# Patient Record
Sex: MALE | Race: WHITE | Employment: UNEMPLOYED | URBAN - METROPOLITAN AREA
[De-identification: names, ages, dates, MRNs, and addresses within clinical notes are randomized per-mention and may not be internally consistent; named-entity substitution may affect disease eponyms.]

---

## 2018-07-12 ENCOUNTER — MEDICAL CORRESPONDENCE (OUTPATIENT)
Dept: HEALTH INFORMATION MANAGEMENT | Facility: CLINIC | Age: 43
End: 2018-07-12

## 2018-07-28 ENCOUNTER — HOSPITAL ENCOUNTER (EMERGENCY)
Facility: CLINIC | Age: 43
Discharge: HOME | End: 2018-07-28
Attending: PSYCHIATRY & NEUROLOGY | Admitting: PSYCHIATRY & NEUROLOGY
Payer: COMMERCIAL

## 2018-07-28 VITALS
SYSTOLIC BLOOD PRESSURE: 115 MMHG | DIASTOLIC BLOOD PRESSURE: 80 MMHG | RESPIRATION RATE: 18 BRPM | TEMPERATURE: 98 F | OXYGEN SATURATION: 98 %

## 2018-07-28 DIAGNOSIS — F19.10 POLYSUBSTANCE ABUSE (H): ICD-10-CM

## 2018-07-28 DIAGNOSIS — F39 MOOD DISORDER (H): ICD-10-CM

## 2018-07-28 LAB
AMPHETAMINES UR QL SCN: POSITIVE
BARBITURATES UR QL: NEGATIVE
BENZODIAZ UR QL: POSITIVE
CANNABINOIDS UR QL SCN: POSITIVE
COCAINE UR QL: NEGATIVE
ETHANOL UR QL SCN: NEGATIVE
OPIATES UR QL SCN: POSITIVE

## 2018-07-28 PROCEDURE — 99283 EMERGENCY DEPT VISIT LOW MDM: CPT | Mod: Z6 | Performed by: PSYCHIATRY & NEUROLOGY

## 2018-07-28 PROCEDURE — 80320 DRUG SCREEN QUANTALCOHOLS: CPT | Performed by: PSYCHIATRY & NEUROLOGY

## 2018-07-28 PROCEDURE — 99285 EMERGENCY DEPT VISIT HI MDM: CPT | Mod: 25 | Performed by: PSYCHIATRY & NEUROLOGY

## 2018-07-28 PROCEDURE — 80307 DRUG TEST PRSMV CHEM ANLYZR: CPT | Performed by: PSYCHIATRY & NEUROLOGY

## 2018-07-28 PROCEDURE — 90791 PSYCH DIAGNOSTIC EVALUATION: CPT

## 2018-07-28 ASSESSMENT — ENCOUNTER SYMPTOMS
ABDOMINAL PAIN: 0
SHORTNESS OF BREATH: 0
FEVER: 0
NERVOUS/ANXIOUS: 0
DYSPHORIC MOOD: 1
HALLUCINATIONS: 0

## 2018-07-28 NOTE — ED AVS SNAPSHOT
[facility], Emergency Department    [address]    [facility] [address]    Phone:  [phone]    Fax:  [phone]                                       Beck Bran   MRN: [id number]    Department:  [facility], Emergency Department   Date of Visit:  7/28/2018           After Visit Summary Signature Page     I have received my discharge instructions, and my questions have been answered. I have discussed any challenges I see with this plan with the nurse or doctor.    ..........................................................................................................................................  Patient/Patient Representative Signature      ..........................................................................................................................................  Patient Representative Print Name and Relationship to Patient    ..................................................               ................................................  Date                                            Time    ..........................................................................................................................................  Reviewed by Signature/Title    ...................................................              ..............................................  Date                                                            Time

## 2018-07-28 NOTE — ED AVS SNAPSHOT
[facility], Emergency Department    [address]    [place] [address]    Phone:  [phone]    Fax:  [phone]                                       Beck Bran   MRN: [id number]    Department:  [facility], Emergency Department   Date of Visit:  7/28/2018           Patient Information     Date Of Birth          1975        Your diagnoses for this visit were:     Mood disorder (H)     Polysubstance abuse        You were seen by Puneet Molina MD.        Discharge Instructions       Stop all illicit substances.  If your are unable to stop on your own, use the list of CD treatment programs and Rule 25 numbers to get yourself into treatmetn    Follow up with therapy when scheduled    Follow up with a medication provider when scheduled    24 Hour Appointment Hotline       To make an appointment at any [place] clinic, call [phone] ([phone]). If you don't have a family doctor or clinic, we will help you find one. [place] clinics are conveniently located to serve the needs of you and your family.             Review of your medicines      Our records show that you are taking the medicines listed below. If these are incorrect, please call your family doctor or clinic.        Dose / Directions Last dose taken    buprenorphine-naloxone 8-2 MG Subl sublingual tablet   Commonly known as:  SUBOXONE   Dose:  1 tablet        Place 1 tablet under the tongue 3 times daily   Refills:  0        CEPHALEXIN PO   Dose:  500 mg        Take 500 mg by mouth 3 times daily   Refills:  0        MELATONIN PO        Take by mouth nightly as needed   Refills:  0        MOTRIN IB PO   Dose:  600 mg        Take 600 mg by mouth 4 times daily   Refills:  0                Procedures and tests performed during your visit     Drug abuse screen 6 urine (chem dep) ([facility])      Orders Needing Specimen Collection     None      Pending Results     No orders found from 7/26/2018 to 7/29/2018.           "  Pending Culture Results     No orders found from 2018 to 2018.            Pending Results Instructions     If you had any lab results that were not finalized at the time of your Discharge, you can call the ED Lab Result RN at [phone]. You will be contacted by this team for any positive Lab results or changes in treatment. The nurses are available 7 days a week from 10A to 6:30P.  You can leave a message 24 hours per day and they will return your call.        Thank you for choosing [facility]       Thank you for choosing [facility] for your care. Our goal is always to provide you with excellent care. Hearing back from our patients is one way we can continue to improve our services. Please take a few minutes to complete the written survey that you may receive in the mail after you visit with us. Thank you!        [employer] Information     [employer] lets you send messages to your doctor, view your test results, renew your prescriptions, schedule appointments and more. To sign up, go to [email or web address] . Click on \"Log in\" on the left side of the screen, which will take you to the Welcome page. Then click on \"Sign up Now\" on the right side of the page.     You will be asked to enter the access code listed below, as well as some personal information. Please follow the directions to create your username and password.     Your access code is: [email or web address]  Expires: 10/26/2018  9:30 PM     Your access code will  in 90 days. If you need help or a new code, please call your [facility] clinic or [phone].        Care EveryWhere ID     This is your Care EveryWhere ID. This could be used by other organizations to access your [facility] medical records  [id number]        Equal Access to Services     DANIKA AVILES : sebastian Arteaga, alesia polo. So [facility] [phone].    ATENCIÓN: Si habla español, tiene a fitzpatrick disposición " servicios gratuitos de asistencia lingüística. Yue ko [phone].    We comply with applicable federal civil rights laws and [place] laws. We do not discriminate on the basis of race, color, national origin, age, disability, sex, sexual orientation, or gender identity.            After Visit Summary       This is your record. Keep this with you and show to your community pharmacist(s) and doctor(s) at your next visit.

## 2018-07-29 NOTE — ED PROVIDER NOTES
"  History     Chief Complaint   Patient presents with     Depression     Pt quit taking suboxone in Feb. His  depression is getting worse.      The history is provided by the patient and medical records.     Beck Bran is a 42 year old male who comes in due to his worsening depression. He came in with his wife who was being seen for a medical reason. He states \"its not fair to my wife to be this depressed all the time.\" He describes feeling sad, not having much energy and isolating. He has little motivation and some anhedonia. He has occasional very passive wishes to be dead.  He denies any suicidal thoughts now. He denies that he has ever attempted. He has a history of opioid dependence and used to be on suboxone maintenance. He thought the suboxone was making him more depressed so he stopped it in Feb.  He has still been depressed. He minimizes his drug use stating he uses marijuana a few times.  He is positive for THC, benzos, amphetamine and opioids. He is hoping to get some resources set up to help.    Please see the 's assessment in EPIC from today (7/28/18) for further details.    I have reviewed the Medications, Allergies, Past Medical and Surgical History, and Social History in the Epic system.    Review of Systems   Constitutional: Negative for fever.   Respiratory: Negative for shortness of breath.    Cardiovascular: Negative for chest pain.   Gastrointestinal: Negative for abdominal pain.   Psychiatric/Behavioral: Positive for dysphoric mood. Negative for hallucinations, self-injury and suicidal ideas. The patient is not nervous/anxious.    All other systems reviewed and are negative.      Physical Exam   BP: 110/79  Heart Rate: 98  Temp: 97.8  F (36.6  C)  SpO2: 99 %      Physical Exam   Constitutional: He is oriented to person, place, and time. He appears well-developed and well-nourished.   HENT:   Head: Normocephalic and atraumatic.   Mouth/Throat: Oropharynx is clear and moist. No " oropharyngeal exudate.   Eyes: Pupils are equal, round, and reactive to light.   Neck: Normal range of motion. Neck supple.   Cardiovascular: Normal rate, regular rhythm and normal heart sounds.    Pulmonary/Chest: Effort normal and breath sounds normal. No respiratory distress.   Abdominal: Soft. Bowel sounds are normal. There is no tenderness.   Musculoskeletal: Normal range of motion.   Neurological: He is alert and oriented to person, place, and time.   Skin: Skin is warm. No rash noted.   Psychiatric: His speech is normal. Thought content normal. He is slowed. He is not actively hallucinating. Thought content is not paranoid and not delusional. Cognition and memory are normal. He expresses inappropriate judgment. He exhibits a depressed mood. He expresses no homicidal and no suicidal ideation. He expresses no suicidal plans and no homicidal plans.   Beck is a 43 y/o male who looks older than his age.  He is slightly disheveled with good eye contact.   Nursing note and vitals reviewed.      ED Course     ED Course     Procedures               Labs Ordered and Resulted from Time of ED Arrival Up to the Time of Departure from the ED   DRUG ABUSE SCREEN 6 CHEM DEP URINE ([facility]) - Abnormal; Notable for the following:        Result Value    Amphetamine Qual Urine Positive (*)     Benzodiazepine Qual Urine Positive (*)     Cannabinoids Qual Urine Positive (*)     Opiates Qualitative Urine Positive (*)     All other components within normal limits            Assessments & Plan (with Medical Decision Making)   Beck will be discharged home.  He is not an imminent risk to himself or others.  He needs to stop using drugs (he minimized his use and has little insight into it) which most likely will help with his depression and the symptoms that he describes. He was given info on CD treatment programs and Rule 25 numbers. He will be set up with therapy and psychiatry via [facility].    I have reviewed the nursing  notes.    I have reviewed the findings, diagnosis, plan and need for follow up with the patient.    New Prescriptions    No medications on file       Final diagnoses:   Mood disorder (H)   Polysubstance abuse       7/28/2018   [facility], [place], EMERGENCY DEPARTMENT     Puneet Molina MD  07/28/18 0985

## 2018-07-29 NOTE — DISCHARGE INSTRUCTIONS
Stop all illicit substances.  If your are unable to stop on your own, use the list of CD treatment programs and Rule 25 numbers to get yourself into treatmetn    Follow up with therapy when scheduled    Follow up with a medication provider when scheduled

## 2018-08-24 DIAGNOSIS — G47.33 OSA (OBSTRUCTIVE SLEEP APNEA): Primary | ICD-10-CM
